# Patient Record
Sex: FEMALE | Race: WHITE | NOT HISPANIC OR LATINO | URBAN - METROPOLITAN AREA
[De-identification: names, ages, dates, MRNs, and addresses within clinical notes are randomized per-mention and may not be internally consistent; named-entity substitution may affect disease eponyms.]

---

## 2023-04-21 ENCOUNTER — EMERGENCY (EMERGENCY)
Facility: HOSPITAL | Age: 28
LOS: 1 days | Discharge: HOME | End: 2023-04-21
Attending: STUDENT IN AN ORGANIZED HEALTH CARE EDUCATION/TRAINING PROGRAM
Payer: COMMERCIAL

## 2023-04-21 VITALS
TEMPERATURE: 98 F | WEIGHT: 145.06 LBS | OXYGEN SATURATION: 99 % | RESPIRATION RATE: 18 BRPM | SYSTOLIC BLOOD PRESSURE: 120 MMHG | DIASTOLIC BLOOD PRESSURE: 83 MMHG | HEART RATE: 90 BPM

## 2023-04-21 LAB
ALBUMIN SERPL ELPH-MCNC: 3.5 G/DL — SIGNIFICANT CHANGE UP (ref 3.5–5)
ALP SERPL-CCNC: 66 U/L — SIGNIFICANT CHANGE UP (ref 40–120)
ALT FLD-CCNC: 16 U/L DA — SIGNIFICANT CHANGE UP (ref 10–60)
ANION GAP SERPL CALC-SCNC: 6 MMOL/L — SIGNIFICANT CHANGE UP (ref 5–17)
AST SERPL-CCNC: 13 U/L — SIGNIFICANT CHANGE UP (ref 10–40)
BASOPHILS # BLD AUTO: 0.07 K/UL — SIGNIFICANT CHANGE UP (ref 0–0.2)
BASOPHILS NFR BLD AUTO: 0.8 % — SIGNIFICANT CHANGE UP (ref 0–2)
BILIRUB SERPL-MCNC: 0.3 MG/DL — SIGNIFICANT CHANGE UP (ref 0.2–1.2)
BUN SERPL-MCNC: 8 MG/DL — SIGNIFICANT CHANGE UP (ref 7–18)
CALCIUM SERPL-MCNC: 10.1 MG/DL — SIGNIFICANT CHANGE UP (ref 8.4–10.5)
CHLORIDE SERPL-SCNC: 104 MMOL/L — SIGNIFICANT CHANGE UP (ref 96–108)
CO2 SERPL-SCNC: 27 MMOL/L — SIGNIFICANT CHANGE UP (ref 22–31)
CREAT SERPL-MCNC: 0.66 MG/DL — SIGNIFICANT CHANGE UP (ref 0.5–1.3)
EGFR: 124 ML/MIN/1.73M2 — SIGNIFICANT CHANGE UP
EOSINOPHIL # BLD AUTO: 0.05 K/UL — SIGNIFICANT CHANGE UP (ref 0–0.5)
EOSINOPHIL NFR BLD AUTO: 0.6 % — SIGNIFICANT CHANGE UP (ref 0–6)
GLUCOSE SERPL-MCNC: 118 MG/DL — HIGH (ref 70–99)
HCG SERPL-ACNC: <1 MIU/ML — SIGNIFICANT CHANGE UP
HCT VFR BLD CALC: 39.2 % — SIGNIFICANT CHANGE UP (ref 34.5–45)
HGB BLD-MCNC: 12.9 G/DL — SIGNIFICANT CHANGE UP (ref 11.5–15.5)
IMM GRANULOCYTES NFR BLD AUTO: 0.3 % — SIGNIFICANT CHANGE UP (ref 0–0.9)
LYMPHOCYTES # BLD AUTO: 2.95 K/UL — SIGNIFICANT CHANGE UP (ref 1–3.3)
LYMPHOCYTES # BLD AUTO: 33 % — SIGNIFICANT CHANGE UP (ref 13–44)
MCHC RBC-ENTMCNC: 28.4 PG — SIGNIFICANT CHANGE UP (ref 27–34)
MCHC RBC-ENTMCNC: 32.9 GM/DL — SIGNIFICANT CHANGE UP (ref 32–36)
MCV RBC AUTO: 86.2 FL — SIGNIFICANT CHANGE UP (ref 80–100)
MONOCYTES # BLD AUTO: 0.46 K/UL — SIGNIFICANT CHANGE UP (ref 0–0.9)
MONOCYTES NFR BLD AUTO: 5.1 % — SIGNIFICANT CHANGE UP (ref 2–14)
NEUTROPHILS # BLD AUTO: 5.38 K/UL — SIGNIFICANT CHANGE UP (ref 1.8–7.4)
NEUTROPHILS NFR BLD AUTO: 60.2 % — SIGNIFICANT CHANGE UP (ref 43–77)
NRBC # BLD: 0 /100 WBCS — SIGNIFICANT CHANGE UP (ref 0–0)
PLATELET # BLD AUTO: 340 K/UL — SIGNIFICANT CHANGE UP (ref 150–400)
POTASSIUM SERPL-MCNC: 3.9 MMOL/L — SIGNIFICANT CHANGE UP (ref 3.5–5.3)
POTASSIUM SERPL-SCNC: 3.9 MMOL/L — SIGNIFICANT CHANGE UP (ref 3.5–5.3)
PROT SERPL-MCNC: 8.3 G/DL — SIGNIFICANT CHANGE UP (ref 6–8.3)
RBC # BLD: 4.55 M/UL — SIGNIFICANT CHANGE UP (ref 3.8–5.2)
RBC # FLD: 12.7 % — SIGNIFICANT CHANGE UP (ref 10.3–14.5)
SODIUM SERPL-SCNC: 137 MMOL/L — SIGNIFICANT CHANGE UP (ref 135–145)
TROPONIN I, HIGH SENSITIVITY RESULT: <3 NG/L — SIGNIFICANT CHANGE UP
WBC # BLD: 8.94 K/UL — SIGNIFICANT CHANGE UP (ref 3.8–10.5)
WBC # FLD AUTO: 8.94 K/UL — SIGNIFICANT CHANGE UP (ref 3.8–10.5)

## 2023-04-21 PROCEDURE — 99285 EMERGENCY DEPT VISIT HI MDM: CPT

## 2023-04-21 PROCEDURE — 80053 COMPREHEN METABOLIC PANEL: CPT

## 2023-04-21 PROCEDURE — 93005 ELECTROCARDIOGRAM TRACING: CPT

## 2023-04-21 PROCEDURE — 84484 ASSAY OF TROPONIN QUANT: CPT

## 2023-04-21 PROCEDURE — 99283 EMERGENCY DEPT VISIT LOW MDM: CPT | Mod: 25

## 2023-04-21 PROCEDURE — 82962 GLUCOSE BLOOD TEST: CPT

## 2023-04-21 PROCEDURE — 85025 COMPLETE CBC W/AUTO DIFF WBC: CPT

## 2023-04-21 PROCEDURE — 84702 CHORIONIC GONADOTROPIN TEST: CPT

## 2023-04-21 PROCEDURE — 36415 COLL VENOUS BLD VENIPUNCTURE: CPT

## 2023-04-21 PROCEDURE — 93010 ELECTROCARDIOGRAM REPORT: CPT

## 2023-04-21 RX ORDER — SODIUM CHLORIDE 9 MG/ML
1000 INJECTION INTRAMUSCULAR; INTRAVENOUS; SUBCUTANEOUS ONCE
Refills: 0 | Status: COMPLETED | OUTPATIENT
Start: 2023-04-21 | End: 2023-04-21

## 2023-04-21 RX ADMIN — SODIUM CHLORIDE 1000 MILLILITER(S): 9 INJECTION INTRAMUSCULAR; INTRAVENOUS; SUBCUTANEOUS at 16:09

## 2023-04-21 NOTE — ED ADULT NURSE NOTE - NSIMPLEMENTINTERV_GEN_ALL_ED
Implemented All Fall Risk Interventions:  [place] to call system. Call bell, personal items and telephone within reach. Instruct patient to call for assistance. Room bathroom lighting operational. Non-slip footwear when patient is off stretcher. Physically safe environment: no spills, clutter or unnecessary equipment. Stretcher in lowest position, wheels locked, appropriate side rails in place. Provide visual cue, wrist band, yellow gown, etc. Monitor gait and stability. Monitor for mental status changes and reorient to person, place, and time. Review medications for side effects contributing to fall risk. Reinforce activity limits and safety measures with patient and family.

## 2023-04-21 NOTE — ED ADULT NURSE NOTE - OBJECTIVE STATEMENT
pt came in the er with c/o syncope episode x today while visiting family, denies any SOB/ CP/mother at bed side /safety maintained

## 2023-04-21 NOTE — ED PROVIDER NOTE - CLINICAL SUMMARY MEDICAL DECISION MAKING FREE TEXT BOX
José Manuel: 27-year-old female with no prior past medical history presents status post syncopal episode today.  Patient states she was in the hospital visiting a patient in the ICU, states she did not eat or drink all day, states she was standing and start experiencing dizziness described as lightheadedness associated with flushing and fainted, falling to the ground.  Patient ambulatory afterwards.  Denies any preceding vision change, chest pain, shortness of breath, or palpitations.  Denies any fevers, chest pain, shortness of breath, abdominal pain, vomiting, diarrhea, dysuria, numbness, weakness, or rash.  Denies any history of similar episodes in the past.  Denies any family history of sudden cardiac death.  Patient states she feels well at this time and denies any additional complaints. No evidence of a cardiac arrythmia such as Brugada, WPW, HOCM, long or short QT.  Neurologic exam is nonfocal, not c/w CVA or primary neurologic abnormality. Will obtain labs r/o anemia r/o pregnancy, provide supportive treatment with dispo pending workup.

## 2023-04-21 NOTE — ED PROVIDER NOTE - PATIENT PORTAL LINK FT
You can access the FollowMyHealth Patient Portal offered by [facility] by registering at the following website: [email or web address]. By joining [facility]’s FollowMyHealth portal, you will also be able to view your health information using other applications (apps) compatible with our system.

## 2023-04-21 NOTE — ED PROVIDER NOTE - PROGRESS NOTE DETAILS
Christopher-: pt seen and re-evaluated at bedside.  Pt states their symptoms have improved.  Pt comfortable in NAD.  Labs non-actionable. Will DC with PMD follow up/return precautions. Pt understood and agreeable with plan..

## 2023-04-21 NOTE — ED PROVIDER NOTE - PHYSICAL EXAMINATION
CONSTITUTIONAL: non-toxic, well appearing  SKIN: no rash, no petechiae.  EYES: PERRL, EOMI, pink conjunctiva, anicteric  ENT: tongue and uvular midline, no exudates, moist mucous membranes  NECK: Supple; no meningismus, no JVD  CARD: RRR, no murmurs, equal radial pulses bilaterally 2+  RESP: CTAB, no respiratory distress  ABD: Soft, non-tender, non-distended, no peritoneal signs, no CVA tenderness  EXT: Normal ROM x4. No edema. No calf tenderness  NEURO: Alert, oriented. Neuro exam nonfocal, equal strength bilaterally. CN II-XII intact.   PSYCH: Cooperative, appropriate.

## 2023-04-21 NOTE — ED PROVIDER NOTE - NSFOLLOWUPINSTRUCTIONS_ED_ALL_ED_FT
Rest and stay well hydrated.    Follow up with your primary care physician in 1-3 days.     Return with any new or worsening symptoms or concerns (see below).  ____________________  Syncope       Syncope refers to a condition in which a person temporarily loses consciousness. Syncope may also be called fainting or passing out. It is caused by a sudden decrease in blood flow to the brain. Even though most causes of syncope are not dangerous, syncope can be a sign of a serious medical problem. Your health care provider may do tests to find the reason why you are having syncope.    Signs that you may be about to faint include:    Feeling dizzy or light-headed.  Feeling nauseous.  Seeing all white or all black in your field of vision.  Having cold, clammy skin.    If you faint, get medical help right away. Call your local emergency services (911 in the [place] Do not drive yourself to the hospital.    Follow these instructions at home:  Pay attention to any changes in your symptoms. Take these actions to stay safe and to help relieve your symptoms:        Lifestyle    Do not drive, use machinery, or play sports until your health care provider says it is okay.  Do not drink alcohol.  Do not use any products that contain nicotine or tobacco, such as cigarettes and e-cigarettes. If you need help quitting, ask your health care provider.  Drink enough fluid to keep your urine pale yellow.        General instructions    Take over-the-counter and prescription medicines only as told by your health care provider.  If you are taking blood pressure or heart medicine, get up slowly and take several minutes to sit and then stand. This can reduce dizziness or light-headedness.  Have someone stay with you until you feel stable.  If you start to feel like you might faint, lie down right away and raise (elevate) your feet above the level of your heart. Breathe deeply and steadily. Wait until all the symptoms have passed.  Keep all follow-up visits as told by your health care provider. This is important.    Get help right away if you:  Have a severe headache.  Faint once or repeatedly.  Have pain in your chest, abdomen, or back.  Have a very fast or irregular heartbeat (palpitations).  Have pain when you breathe.  Are bleeding from your mouth or rectum, or you have black or tarry stool.  Have a seizure.  Are confused.  Have trouble walking.  Have severe weakness.  Have vision problems.    These symptoms may represent a serious problem that is an emergency. Do not wait to see if your symptoms will go away. Get medical help right away. Call your local emergency services (911 in the [place] Do not drive yourself to the hospital.    Summary  Syncope refers to a condition in which a person temporarily loses consciousness. It is caused by a sudden decrease in blood flow to the brain.  Signs that you may be about to faint include dizziness, feeling light-headed, feeling nauseous, sudden vision changes, or cold, clammy skin.  Although most causes of syncope are not dangerous, syncope can be a sign of a serious medical problem. If you faint, get medical help right away.

## 2023-04-21 NOTE — ED PROVIDER NOTE - OBJECTIVE STATEMENT
27-year-old female with no prior past medical history presents status post syncopal episode today.  Patient states she was in the hospital visiting a patient in the ICU, states she did not eat or drink all day, states she was standing and start experiencing dizziness described as lightheadedness associated with flushing and fainted, falling to the ground.  Patient ambulatory afterwards.  Denies any preceding vision change, chest pain, shortness of breath, or palpitations.  Denies any fevers, chest pain, shortness of breath, abdominal pain, vomiting, diarrhea, dysuria, numbness, weakness, or rash.  Denies any history of similar episodes in the past.  Denies any family history of sudden cardiac death.  Patient states she feels well at this time and denies any additional complaints.

## 2023-12-29 NOTE — ED ADULT TRIAGE NOTE - AS TEMP SITE
Last seen 12/13/23  D&C 12/14/23  Pt c/o having a lot of aches and pains since D&C.  Notes her legs and hips both aching constantly.   Was having pain behind her knee and was seen for this to r/o DVT.  Has been to the ER and urgent care multiple times for different concerns.  At first she thought it was from the position of her legs during the surgery.  The pain has not improved and she has been using magnesium lotion on it.  She currently has covid which she thinks is making it worse.  RN advised that she should take Tylenol and ibuprofen and contact PCP.  Pt verbalized understanding of recommendations and is in agreement with POC.  Pt has no additional concerns or questions at this time.        oral